# Patient Record
Sex: FEMALE | Race: WHITE
[De-identification: names, ages, dates, MRNs, and addresses within clinical notes are randomized per-mention and may not be internally consistent; named-entity substitution may affect disease eponyms.]

---

## 2019-12-01 ENCOUNTER — HOSPITAL ENCOUNTER (EMERGENCY)
Dept: HOSPITAL 44 - ED | Age: 34
Discharge: HOME | End: 2019-12-01
Payer: COMMERCIAL

## 2019-12-01 VITALS — DIASTOLIC BLOOD PRESSURE: 92 MMHG | SYSTOLIC BLOOD PRESSURE: 152 MMHG

## 2019-12-01 DIAGNOSIS — K59.00: Primary | ICD-10-CM

## 2019-12-01 LAB
BASOPHILS NFR BLD: 0.6 % (ref 0–1.5)
EGFR (NON-AFRICAN): > 60
MCV RBC AUTO: 86 FL (ref 80–100)
NEUTROPHILS #: 7.3 # K/UL (ref 1.4–7.7)

## 2019-12-01 PROCEDURE — S1016 NON-PVC INTRAVENOUS ADMINIST: HCPCS

## 2019-12-01 PROCEDURE — 82553 CREATINE MB FRACTION: CPT

## 2019-12-01 PROCEDURE — 85025 COMPLETE CBC W/AUTO DIFF WBC: CPT

## 2019-12-01 PROCEDURE — 84484 ASSAY OF TROPONIN QUANT: CPT

## 2019-12-01 PROCEDURE — 99284 EMERGENCY DEPT VISIT MOD MDM: CPT

## 2019-12-01 PROCEDURE — 96372 THER/PROPH/DIAG INJ SC/IM: CPT

## 2019-12-01 PROCEDURE — 80053 COMPREHEN METABOLIC PANEL: CPT

## 2019-12-01 PROCEDURE — 36415 COLL VENOUS BLD VENIPUNCTURE: CPT

## 2019-12-01 PROCEDURE — 93005 ELECTROCARDIOGRAM TRACING: CPT

## 2019-12-01 PROCEDURE — 96360 HYDRATION IV INFUSION INIT: CPT

## 2019-12-01 PROCEDURE — 74018 RADEX ABDOMEN 1 VIEW: CPT

## 2019-12-01 PROCEDURE — 82550 ASSAY OF CK (CPK): CPT

## 2019-12-01 RX ADMIN — RETINOL, ERGOCALCIFEROL, .ALPHA.-TOCOPHEROL ACETATE, DL-, PHYTONADIONE, ASCORBIC ACID, NIACINAMIDE, RIBOFLAVIN 5-PHOSPHATE SODIUM, THIAMINE HYDROCHLORIDE, PYRIDOXINE HYDROCHLORIDE, DEXPANTHENOL, BIOTIN, FOLIC ACID, AND CYANOCOBALAMIN ONE MLS/HR: KIT at 14:53

## 2019-12-01 RX ADMIN — Medication ONE OZ: at 15:30

## 2019-12-01 NOTE — DIAGNOSTIC IMAGING REPORT
PATIENT MR#:   [id number]

PATIENT ACCT#: [id number]

PATIENT NAME:  CLARA DUNLAP

YOB: 1985

REFERRING PHYSICIAN: Jerrica Ivan

EXAM DATE:        12/1/2019

ACCESSION NUMBER: [id number]

EXAM DESCRIPTION: ABDOMEN 1VIEW



KUB



Clinical history: Abdominal pain 



Technique ap supine radiograph of the abdomen 



Findings: The bowel gas pattern shows retained fecal material in the colon. No renal calcifications a
re seen. The bones

are within normal limits. No abdominal masses identified. No pathologic calcifications are identified
 



Impression: Retained fecal material through the colon



Electronically signed by: Zeus Gomez on 12/01/2019 15:14:41









Read by:        Dr. Zeus Gomez

Transcribed by:   

Transcribed Date: 

Electronically signed by: Dr. Zeus Gomez

Date signed: 12/1/2019 3:18:54 PM

## 2019-12-01 NOTE — ED PHYSICIAN DOCUMENTATION
Abdominal Pain





- HISTORIAN


Historian: patient





- HPI


Stated Complaint: Chest pain


Chief Complaint: Abdominal Pain


Additonal Information: 


34 year old female presents via [facility] from [facility] for c/o chest pain; 

actually stems from the abdomen.  She states pain is sharp and radiates to the 

chest and then to her back.  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  She 

last used 4 days ago; states she takes 400 immodium daily to get high.  She 

denies any history of other drug use or alcohol use.  She c/o some nausea- no 

vomiting.  Denies any constipation- last BM today and normal.  She states that 

she feels very anxious and is requesting something for the anxiety. Pain started

while she was lying in bed about an hour prior to arrival.


Onset: minutes (pain started while lying in bed.)


Duration: waxing, waning


Timing: still present


Context: denies: out of country travel, bad food


Severity: moderate


Quality: pain, sharp (intermittent/ reproducible)


Associated Symptoms: nausea, back pain


Exacerbated by: movements, deep breaths


Relieved by: remaining still





- ROS


CONST: recent illness (addiction to Immodium)


GI/: none


CVS/RESP: none


EYES/ENT: none


MS/SKIN/LYMPH: none


NEURO/PSYCH: anxiety





- SOCIAL HX


Smoking History: greater than 1 pack/day


Alcohol Use: none (denies)


Drug Use: none (denies any other addictions or use)





- FAMILY HX


Family History: none





- PAST HX


Past History: other (hypothyroid)


Ischemic Bowel Risk Factors: none


Other History: none, other (No pregnancy)


Surgeries/Procedures: none


Immunizations: UTD


Home Medications: 


                                Ambulatory Orders











 Medication  Instructions  Recorded


 


Unobtainable  12/01/19











Allergies/Adverse Reactions: 


                                    Allergies











Allergy/AdvReac Type Severity Reaction Status Date / Time


 


No Known Allergies Allergy   Verified 12/01/19 14:35














- VITAL SIGNS


Vital Signs: 


                                   Vital Signs











Temp Pulse Resp BP Pulse Ox


 


 98.0 F   80   19   152/92   97 


 


 12/01/19 14:06  12/01/19 15:44  12/01/19 15:44  12/01/19 15:44  12/01/19 15:44














- REVIEWED ASSESSMENTS


Nursing Assessment  Reviewed: Yes


Vitals Reviewed: Yes





ED Results Lab/Radiology





- Lab Results


Lab Results: 


                                   Lab Results











  12/01/19 12/01/19





  14:20 14:20


 


WBC    11.70 K/ul K/ul





    (4.00-12.00) 


 


RBC    5.33 M/ul H M/ul





    (3.90-5.20) 


 


Hgb    15.4 g/dL g/dL





    (11.5-16.0) 


 


Hct    46.1 % %





    (34.5-46.5) 


 


MCV    86.0 fl fl





    (80.0-100.0) 


 


MCH    29.0 pg pg





    (28.0-34.0) 


 


MCHC    33.5 g/dL g/dL





    (30.0-36.0) 


 


RDW    11.1 % L %





    (11.3-14.3) 


 


Plt Count    278 K/mm3 K/mm3





    (130-400) 


 


Neut % (Auto)    62.4 % %





    (39.0-79.0) 


 


Lymph % (Auto)    27.3 % %





    (16.0-50.0) 


 


Mono % (Auto)    8.5 % %





    (0.0-11.0) 


 


Eos % (Auto)    1.2 % %





    (0.0-6.8) 


 


Baso % (Auto)    0.6 % %





    (0.0-1.5) 


 


Neut # (Auto)    7.3 # k/uL # k/uL





    (1.4-7.7) 


 


Lymph # (Auto)    3.2 # k/uL # k/uL





    (0.6-4.0) 


 


Mono # (Auto)    1.0 # k/uL H # k/uL





    (0.0-0.9) 


 


Eos # (Auto)    0.1 # k/uL # k/uL





    (0.0-0.6) 


 


Baso # (Auto)    0.1 # k/uL # k/uL





    (0.0-0.5) 


 


Sodium  139 mmol/L mmol/L  





   (137-145)  


 


Potassium  4.0 mmol/L mmol/L  





   (3.5-5.1)  


 


Chloride  105 mmol/L mmol/L  





   ()  


 


Carbon Dioxide  24 mmol/L mmol/L  





   (22-30)  


 


Anion Gap  14.0   





   


 


BUN  14 mg/dL mg/dL  





   (7-17)  


 


Creatinine  0.75 mg/dL mg/dL  





   (0.52-1.04)  


 


Estimated Creat Clear  174   





   


 


Est GFR ( Amer)  > 60   





  (60 - ) 


 


Est GFR (Non-Af Amer)  > 60   





  (60 - ) 


 


Glucose  114 mg/dL H mg/dL  





   ()  


 


Calcium  9.9 mg/dL mg/dL  





   (8.4-10.2)  


 


Total Bilirubin  1.2 mg/dL mg/dL  





   (0.2-1.3)  


 


AST  56 U/L H U/L  





   (15-46)  


 


ALT  34 U/L U/L  





   (0-35)  


 


Alkaline Phosphatase  121 U/L U/L  





   ()  


 


Creatine Kinase  118 U/L U/L  





   ()  


 


CK-MB (CK-2)  1.7 ng/mL ng/mL  





   (0.0-5.6)  


 


Troponin I  0.012 ng/mL ng/mL  





   (0.012-0.034)  


 


Total Protein  8.6 g/dL H g/dL  





   (6.3-8.2)  


 


Albumin  4.7 g/dL g/dL  





   (3.5-5.0)  














- Orders


Orders: 


                                    ED Orders











 Category Date Time Status


 


 Continuous EKG monitoring Q30M Care  12/01/19 14:08 Active


 


 Continuous Pulse Oximetry Q30M Care  12/01/19 14:08 Active


 


 Place IV Lock 1T Care  12/01/19 14:08 Active


 


 ABDOMEN 1VIEW [RAD] Stat Exams  12/01/19 Completed


 


 CBC/PLATELET/DIFF Stat Lab  12/01/19 14:20 Completed


 


 CKMB Stat Lab  12/01/19 14:20 Completed


 


 CMP Stat Lab  12/01/19 14:20 Completed


 


 CREATINE KINASE Stat Lab  12/01/19 14:20 Completed


 


 TROPONIN I Stat Lab  12/01/19 14:20 Completed


 


 0.9 % Sodium Chloride [Normal Saline] 1,000 ml Med  12/01/19 14:09 Discontinued





 IV Q1H   


 


 Magnesium Citrate [Citrate of Magnesia] Med  12/01/19 15:21 Discontinued





 296 ml PO NOW ONE   


 


 hydrOXYzine HCL [Vistaril] Med  12/01/19 14:37 Discontinued





 25 mg IM NOW PRN   


 


 hydrOXYzine HCL [Vistaril] Med  12/01/19 14:37 Discontinued





 50 mg IM .STK-MED ONE   


 


 EKG WITH COMPARISON Stat Ther  12/01/19 14:08 Completed














Abdominal Pain Physical Exam





- Physical Exam


General Appearance: alert, mild distress


EENT: eye inspection normal, ENT inspection normal, pharynx normal, [place]


NECK: normal inspection, supple


RESPIRATORY: breath sounds normal


CVS: heart sounds normal, equal pulses, no murmur, no gallop, other 

(reproducible chest wall pain)


ABDOMEN: soft, decreased BS


BACK: normal inspection


SKIN: warm/dry, normal color


EXTREMITIES: normal range of motion


NEURO: oriented X3, CN's nml as tested, motor nml, sensation nml, cognition 

normal


Vital Signs: 


                                   Vital Signs











Temp Pulse Resp BP Pulse Ox


 


 98.0 F   80   19   152/92   97 


 


 12/01/19 14:06  12/01/19 15:44  12/01/19 15:44  12/01/19 15:44  12/01/19 15:44














Discharge


Clincal Impression: 


 Constipation





Referrals: 


Primary Doctor,No [Primary Care Provider] - 2 Days


Additional Instructions: 


Xray shows stool throughout; lots of gas (probably the cause of sharp pain)


Mag Citrate started in the ER


Increase water intake


Increase Fiber in Diet


May use Miralax 1 capful by mouth twice a day


Follow up with PCP 


Condition: Good


Disposition: 01 HOME, SELF-CARE


Decision to Admit: NO


Decision Time: 15:30